# Patient Record
Sex: FEMALE | Race: WHITE | NOT HISPANIC OR LATINO | URBAN - METROPOLITAN AREA
[De-identification: names, ages, dates, MRNs, and addresses within clinical notes are randomized per-mention and may not be internally consistent; named-entity substitution may affect disease eponyms.]

---

## 2017-11-30 ENCOUNTER — APPOINTMENT (RX ONLY)
Dept: URBAN - METROPOLITAN AREA CLINIC 91 | Facility: CLINIC | Age: 77
Setting detail: DERMATOLOGY
End: 2017-11-30

## 2017-11-30 DIAGNOSIS — Z85.820 PERSONAL HISTORY OF MALIGNANT MELANOMA OF SKIN: ICD-10-CM

## 2017-11-30 DIAGNOSIS — L85.3 XEROSIS CUTIS: ICD-10-CM

## 2017-11-30 DIAGNOSIS — D18.0 HEMANGIOMA: ICD-10-CM

## 2017-11-30 DIAGNOSIS — L57.0 ACTINIC KERATOSIS: ICD-10-CM

## 2017-11-30 PROBLEM — L55.1 SUNBURN OF SECOND DEGREE: Status: ACTIVE | Noted: 2017-11-30

## 2017-11-30 PROBLEM — D48.5 NEOPLASM OF UNCERTAIN BEHAVIOR OF SKIN: Status: ACTIVE | Noted: 2017-11-30

## 2017-11-30 PROBLEM — D18.01 HEMANGIOMA OF SKIN AND SUBCUTANEOUS TISSUE: Status: ACTIVE | Noted: 2017-11-30

## 2017-11-30 PROCEDURE — 11100: CPT | Mod: 59

## 2017-11-30 PROCEDURE — ? LIQUID NITROGEN

## 2017-11-30 PROCEDURE — 11101: CPT

## 2017-11-30 PROCEDURE — 99214 OFFICE O/P EST MOD 30 MIN: CPT | Mod: 25

## 2017-11-30 PROCEDURE — ? TREATMENT REGIMEN

## 2017-11-30 PROCEDURE — 17000 DESTRUCT PREMALG LESION: CPT

## 2017-11-30 PROCEDURE — ? COUNSELING

## 2017-11-30 PROCEDURE — ? BIOPSY BY SHAVE METHOD

## 2017-11-30 ASSESSMENT — LOCATION DETAILED DESCRIPTION DERM
LOCATION DETAILED: LEFT MEDIAL BREAST 10-11:00 REGION
LOCATION DETAILED: NASAL DORSUM
LOCATION DETAILED: LEFT DISTAL PRETIBIAL REGION
LOCATION DETAILED: LEFT POSTERIOR ANKLE

## 2017-11-30 ASSESSMENT — LOCATION SIMPLE DESCRIPTION DERM
LOCATION SIMPLE: LEFT ANKLE
LOCATION SIMPLE: NOSE
LOCATION SIMPLE: LEFT BREAST
LOCATION SIMPLE: LEFT PRETIBIAL REGION

## 2017-11-30 ASSESSMENT — LOCATION ZONE DERM
LOCATION ZONE: TRUNK
LOCATION ZONE: NOSE
LOCATION ZONE: LEG

## 2017-11-30 NOTE — PROCEDURE: MIPS QUALITY
Quality 137: Melanoma: Continuity Of Care - Recall System: Patient information entered into a recall system that includes: target date for the next exam specified AND a process to follow up with patients regarding missed or unscheduled appointments
Detail Level: Detailed
Quality 111:Pneumonia Vaccination Status For Older Adults: Pneumococcal Vaccination Previously Received
Quality 138: Melanoma: Coordination Of Care: A treatment plan was communicated to the physicians providing continuing care within one month of diagnosis outlining: diagnosis, tumor thickness and a plan for surgery or alternate care.
Quality 110: Preventive Care And Screening: Influenza Immunization: Influenza Immunization previously received during influenza season
Quality 130: Documentation Of Current Medications In The Medical Record: Current Medications Documented

## 2017-11-30 NOTE — PROCEDURE: LIQUID NITROGEN
Duration Of Freeze Thaw-Cycle (Seconds): 6
Detail Level: Detailed
Post-Care Instructions: Reviewed in detail post-care instructions. Patient is to wear sun protection, and avoid picking at any of the treated lesions. Pt may apply Vaseline to crusted or scabbing areas.
Number Of Freeze-Thaw Cycles: 2 freeze-thaw cycles
Consent: The patient's consent was obtained including but not limited to risks of crusting, scabbing, blistering, scarring, darker or lighter pigmentary change, recurrence, incomplete removal and infection.
Render Post-Care Instructions In Note?: yes

## 2017-11-30 NOTE — PROCEDURE: BIOPSY BY SHAVE METHOD
Post-care instructions reviewed with the patient in detail. The patient is to keep the biopsy site dry overnight. Remove the bandage and shower as normal with soap and water, dry the area, apply vaseline and a band-aid. Repeat this process daily for five days.
Silver Nitrate Text: The wound bed was treated with silver nitrate after the biopsy was performed.
Additional Anesthesia Volume In Cc (Will Not Render If 0): 0
Biopsy Method: 15 blade
Type Of Destruction Used: Curettage
Billing Type: [employer]
Bill For Surgical Tray: no
Cryotherapy Text: The wound bed was treated with cryotherapy after the biopsy was performed.
Consent was obtained and risks were reviewed including but not limited to scarring, infection, bleeding, scabbing, incomplete removal, nerve damage and allergy to anesthesia.
Wound Care: Vaseline
Dressing: bandage
Hemostasis: Aluminum Chloride
Notification Instructions: Patient will be notified of biopsy results. However, patient instructed to call the office if not contacted within 2 weeks.
Render Post-Care Instructions In Note?: yes
Anesthesia Type: 1% lidocaine with epinephrine
Biopsy Type: H and E
Anesthesia Volume In Cc (Will Not Render If 0): 0.5
Curettage Text: The wound bed was treated with curettage after the biopsy was performed.
Detail Level: Detailed
Electrodesiccation And Curettage Text: The wound bed was treated with electrodesiccation and curettage after the biopsy was performed.
Size Of Lesion In Cm: 1
Billing Type: Third-Party Bill
Electrodesiccation Text: The wound bed was treated with electrodesiccation after the biopsy was performed.
Size Of Lesion In Cm: 1.6

## 2018-02-08 ENCOUNTER — APPOINTMENT (RX ONLY)
Dept: URBAN - METROPOLITAN AREA CLINIC 91 | Facility: CLINIC | Age: 78
Setting detail: DERMATOLOGY
End: 2018-02-08

## 2018-02-08 PROBLEM — C44.519 BASAL CELL CARCINOMA OF SKIN OF OTHER PART OF TRUNK: Status: ACTIVE | Noted: 2018-02-08

## 2018-02-08 PROCEDURE — ? CURETTAGE AND DESTRUCTION

## 2018-02-08 PROCEDURE — 17263 DSTRJ MAL LES T/A/L 2.1-3.0: CPT

## 2018-02-08 NOTE — PROCEDURE: CURETTAGE AND DESTRUCTION
Size Of Lesion After Curettage: 2.6
Cautery Type: electrocautery (heat cautery)
Medication Injected: 5-Fluorouracil
Add Intralesional Injection: No
Bill As A Line Item Or As Units: Line Item
Detail Level: Detailed
Number Of Curettages: 3
Consent was obtained from the patient. The risks, benefits and alternatives to therapy were discussed in detail. Specifically, the risks of infection, scarring, bleeding, prolonged wound healing, nerve injury, incomplete removal, allergy to anesthesia and recurrence were addressed. Prior to the procedure, the treatment site was clearly identified and confirmed by the patient. All components of Universal Protocol/PAUSE Rule completed.
Size Of Lesion In Cm: 1.6
Render Post-Care Instructions In Note?: yes
Anesthesia Volume In Cc: 2
Total Volume (Ccs): 1
What Was Performed First?: Curettage
Additional Information: (Optional): The wound was cleaned, and a pressure dressing was applied. The patient received detailed post-op instructions.
Post-care instructions reviewed with the patient in detail. The patient is to keep the treated site dry overnight. Remove the bandage and shower as normal with soap and water, dry the area, apply vaseline and a band-aid. Repeat this process daily for 10 days. Should the patient develop any fevers, chills, bleeding, severe pain patient will contact the office immediately.
Anesthesia Type: 1% lidocaine with epinephrine

## 2018-05-31 ENCOUNTER — APPOINTMENT (RX ONLY)
Dept: URBAN - METROPOLITAN AREA CLINIC 91 | Facility: CLINIC | Age: 78
Setting detail: DERMATOLOGY
End: 2018-05-31

## 2018-05-31 DIAGNOSIS — L57.0 ACTINIC KERATOSIS: ICD-10-CM

## 2018-05-31 DIAGNOSIS — Z85.820 PERSONAL HISTORY OF MALIGNANT MELANOMA OF SKIN: ICD-10-CM

## 2018-05-31 DIAGNOSIS — D69.2 OTHER NONTHROMBOCYTOPENIC PURPURA: ICD-10-CM

## 2018-05-31 PROBLEM — D48.5 NEOPLASM OF UNCERTAIN BEHAVIOR OF SKIN: Status: ACTIVE | Noted: 2018-05-31

## 2018-05-31 PROBLEM — Z85.828 PERSONAL HISTORY OF OTHER MALIGNANT NEOPLASM OF SKIN: Status: ACTIVE | Noted: 2018-05-31

## 2018-05-31 PROCEDURE — 99213 OFFICE O/P EST LOW 20 MIN: CPT | Mod: 25

## 2018-05-31 PROCEDURE — ? LIQUID NITROGEN

## 2018-05-31 PROCEDURE — ? COUNSELING

## 2018-05-31 PROCEDURE — ? BIOPSY BY SHAVE METHOD

## 2018-05-31 PROCEDURE — 17000 DESTRUCT PREMALG LESION: CPT

## 2018-05-31 PROCEDURE — 17003 DESTRUCT PREMALG LES 2-14: CPT

## 2018-05-31 PROCEDURE — 11100: CPT | Mod: 59

## 2018-05-31 ASSESSMENT — LOCATION ZONE DERM
LOCATION ZONE: TRUNK
LOCATION ZONE: NOSE
LOCATION ZONE: LEG
LOCATION ZONE: ARM
LOCATION ZONE: HAND
LOCATION ZONE: FACE

## 2018-05-31 ASSESSMENT — LOCATION SIMPLE DESCRIPTION DERM
LOCATION SIMPLE: NOSE
LOCATION SIMPLE: LEFT HAND
LOCATION SIMPLE: RIGHT UPPER BACK
LOCATION SIMPLE: LEFT ANKLE
LOCATION SIMPLE: LEFT FOREHEAD
LOCATION SIMPLE: RIGHT FOREARM
LOCATION SIMPLE: RIGHT CHEEK
LOCATION SIMPLE: LEFT FOREARM
LOCATION SIMPLE: LEFT CHEEK

## 2018-05-31 ASSESSMENT — LOCATION DETAILED DESCRIPTION DERM
LOCATION DETAILED: RIGHT SUPERIOR UPPER BACK
LOCATION DETAILED: LEFT POSTERIOR ANKLE
LOCATION DETAILED: LEFT MEDIAL FOREHEAD
LOCATION DETAILED: LEFT CENTRAL MALAR CHEEK
LOCATION DETAILED: RIGHT INFERIOR CENTRAL MALAR CHEEK
LOCATION DETAILED: LEFT ULNAR DORSAL HAND
LOCATION DETAILED: LEFT DISTAL DORSAL FOREARM
LOCATION DETAILED: NASAL DORSUM
LOCATION DETAILED: RIGHT DISTAL DORSAL FOREARM

## 2018-05-31 NOTE — PROCEDURE: BIOPSY BY SHAVE METHOD
Electrodesiccation And Curettage Text: The wound bed was treated with electrodesiccation and curettage after the biopsy was performed.
Render Post-Care Instructions In Note?: yes
Dressing: bandage
Biopsy Type: H and E
Notification Instructions: Patient will be notified of biopsy results. However, patient instructed to call the office if not contacted within 2 weeks.
Anesthesia Type: 1% lidocaine with epinephrine
Electrodesiccation Text: The wound bed was treated with electrodesiccation after the biopsy was performed.
Anesthesia Volume In Cc (Will Not Render If 0): 0.5
Bill 72754 For Specimen Handling/Conveyance To Laboratory?: no
Billing Type: [employer]
Consent was obtained and risks were reviewed including but not limited to scarring, infection, bleeding, scabbing, incomplete removal, nerve damage and allergy to anesthesia.
X Size Of Lesion In Cm: 0
Biopsy Method: 15 blade
Detail Level: Detailed
Type Of Destruction Used: Curettage
Wound Care: Vaseline
Post-care instructions reviewed with the patient in detail. The patient is to keep the biopsy site dry overnight. Remove the bandage and shower as normal with soap and water, dry the area, apply vaseline and a band-aid. Repeat this process daily for five days.
Hemostasis: Aluminum Chloride
Silver Nitrate Text: The wound bed was treated with silver nitrate after the biopsy was performed.
Cryotherapy Text: The wound bed was treated with cryotherapy after the biopsy was performed.
Curettage Text: The wound bed was treated with curettage after the biopsy was performed.
Size Of Lesion In Cm: 1.1

## 2018-05-31 NOTE — PROCEDURE: LIQUID NITROGEN
Number Of Freeze-Thaw Cycles: 2 freeze-thaw cycles
Render Post-Care Instructions In Note?: yes
Post-Care Instructions: Reviewed in detail post-care instructions. Patient is to wear sun protection, and avoid picking at any of the treated lesions. Pt may apply Vaseline to crusted or scabbing areas.
Duration Of Freeze Thaw-Cycle (Seconds): 6
Detail Level: Detailed
Consent: The patient's consent was obtained including but not limited to risks of crusting, scabbing, blistering, scarring, darker or lighter pigmentary change, recurrence, incomplete removal and infection.

## 2018-12-04 ENCOUNTER — APPOINTMENT (RX ONLY)
Dept: URBAN - METROPOLITAN AREA CLINIC 91 | Facility: CLINIC | Age: 78
Setting detail: DERMATOLOGY
End: 2018-12-04

## 2018-12-04 DIAGNOSIS — L85.3 XEROSIS CUTIS: ICD-10-CM

## 2018-12-04 DIAGNOSIS — L57.0 ACTINIC KERATOSIS: ICD-10-CM

## 2018-12-04 DIAGNOSIS — Z85.820 PERSONAL HISTORY OF MALIGNANT MELANOMA OF SKIN: ICD-10-CM

## 2018-12-04 PROBLEM — Z85.828 PERSONAL HISTORY OF OTHER MALIGNANT NEOPLASM OF SKIN: Status: ACTIVE | Noted: 2018-12-04

## 2018-12-04 PROBLEM — D48.5 NEOPLASM OF UNCERTAIN BEHAVIOR OF SKIN: Status: ACTIVE | Noted: 2018-12-04

## 2018-12-04 PROBLEM — L55.1 SUNBURN OF SECOND DEGREE: Status: ACTIVE | Noted: 2018-12-04

## 2018-12-04 PROCEDURE — ? BIOPSY BY SHAVE METHOD

## 2018-12-04 PROCEDURE — 11100: CPT | Mod: 59

## 2018-12-04 PROCEDURE — 17000 DESTRUCT PREMALG LESION: CPT

## 2018-12-04 PROCEDURE — ? COUNSELING

## 2018-12-04 PROCEDURE — ? LIQUID NITROGEN

## 2018-12-04 PROCEDURE — 99214 OFFICE O/P EST MOD 30 MIN: CPT | Mod: 25

## 2018-12-04 PROCEDURE — 17003 DESTRUCT PREMALG LES 2-14: CPT

## 2018-12-04 ASSESSMENT — LOCATION ZONE DERM
LOCATION ZONE: NOSE
LOCATION ZONE: LEG
LOCATION ZONE: ARM

## 2018-12-04 ASSESSMENT — LOCATION SIMPLE DESCRIPTION DERM
LOCATION SIMPLE: RIGHT FOREARM
LOCATION SIMPLE: LEFT FOREARM
LOCATION SIMPLE: LEFT ANKLE
LOCATION SIMPLE: NOSE

## 2018-12-04 ASSESSMENT — LOCATION DETAILED DESCRIPTION DERM
LOCATION DETAILED: LEFT POSTERIOR ANKLE
LOCATION DETAILED: RIGHT NASAL DORSUM
LOCATION DETAILED: LEFT DISTAL DORSAL FOREARM
LOCATION DETAILED: RIGHT DISTAL DORSAL FOREARM

## 2018-12-04 NOTE — PROCEDURE: BIOPSY BY SHAVE METHOD
Detail Level: Detailed
Anesthesia Type: 1% lidocaine with epinephrine
Additional Anesthesia Volume In Cc (Will Not Render If 0): 0
Was A Bandage Applied: Yes
Hemostasis: Aluminum Chloride
Destruction After The Procedure: No
Electrodesiccation Text: The wound bed was treated with electrodesiccation after the biopsy was performed.
Type Of Destruction Used: Curettage
Silver Nitrate Text: The wound bed was treated with silver nitrate after the biopsy was performed.
Consent was obtained and risks were reviewed including but not limited to scarring, infection, bleeding, scabbing, incomplete removal, nerve damage and allergy to anesthesia.
Notification Instructions: Patient will be notified of biopsy results. However, patient instructed to call the office if not contacted within 2 weeks.
Dressing: bandage
Wound Care: Vaseline
Anesthesia Volume In Cc (Will Not Render If 0): 0.5
Biopsy Type: H and E
Billing Type: [employer]
Cryotherapy Text: The wound bed was treated with cryotherapy after the biopsy was performed.
Curettage Text: The wound bed was treated with curettage after the biopsy was performed.
Post-care instructions reviewed with the patient in detail. The patient is to keep the biopsy site dry overnight. Remove the bandage and shower as normal with soap and water, dry the area, apply vaseline and a band-aid. Repeat this process daily for five days.
Size Of Lesion In Cm: 0.6
Biopsy Method: 15 blade
Depth Of Biopsy: dermis
Electrodesiccation And Curettage Text: The wound bed was treated with electrodesiccation and curettage after the biopsy was performed.

## 2018-12-04 NOTE — PROCEDURE: LIQUID NITROGEN
Post-Care Instructions: Reviewed in detail post-care instructions. Patient is to wear sun protection, and avoid picking at any of the treated lesions. Pt may apply Vaseline to crusted or scabbing areas.
Number Of Freeze-Thaw Cycles: 2 freeze-thaw cycles
Consent: The patient's consent was obtained including but not limited to risks of crusting, scabbing, blistering, scarring, darker or lighter pigmentary change, recurrence, incomplete removal and infection.
Render Post-Care Instructions In Note?: yes
Detail Level: Detailed
Duration Of Freeze Thaw-Cycle (Seconds): 6

## 2018-12-04 NOTE — PROCEDURE: MIPS QUALITY
Quality 137: Melanoma: Continuity Of Care - Recall System: Patient information entered into a recall system that includes: target date for the next exam specified AND a process to follow up with patients regarding missed or unscheduled appointments
Detail Level: Detailed
Quality 110: Preventive Care And Screening: Influenza Immunization: Influenza Immunization previously received during influenza season
Quality 138: Melanoma: Coordination Of Care: A treatment plan was communicated to the physicians providing continuing care within one month of diagnosis outlining: diagnosis, tumor thickness and a plan for surgery or alternate care.
Quality 130: Documentation Of Current Medications In The Medical Record: Current Medications Documented
Quality 111:Pneumonia Vaccination Status For Older Adults: Pneumococcal Vaccination Previously Received

## 2019-02-21 ENCOUNTER — APPOINTMENT (RX ONLY)
Dept: URBAN - METROPOLITAN AREA CLINIC 91 | Facility: CLINIC | Age: 79
Setting detail: DERMATOLOGY
End: 2019-02-21

## 2019-02-21 PROBLEM — Z85.820 PERSONAL HISTORY OF MALIGNANT MELANOMA OF SKIN: Status: ACTIVE | Noted: 2019-02-21

## 2019-02-21 PROBLEM — D48.5 NEOPLASM OF UNCERTAIN BEHAVIOR OF SKIN: Status: ACTIVE | Noted: 2019-02-21

## 2019-02-21 PROBLEM — C44.41 BASAL CELL CARCINOMA OF SKIN OF SCALP AND NECK: Status: ACTIVE | Noted: 2019-02-21

## 2019-02-21 PROCEDURE — 17272 DSTR MAL LES S/N/H/F/G 1.1-2: CPT

## 2019-02-21 PROCEDURE — ? PATHOLOGY DISCUSSION

## 2019-02-21 PROCEDURE — ? CURETTAGE AND DESTRUCTION

## 2019-02-21 PROCEDURE — ? COUNSELING

## 2019-02-21 NOTE — PROCEDURE: CURETTAGE AND DESTRUCTION
Hide Accession Number?: No
Bill As A Line Item Or As Units: Line Item
Total Volume (Ccs): 1
Consent was obtained from the patient. The risks, benefits and alternatives to therapy were discussed in detail. Specifically, the risks of infection, scarring, bleeding, prolonged wound healing, nerve injury, incomplete removal, allergy to anesthesia and recurrence were addressed. Prior to the procedure, the treatment site was clearly identified and confirmed by the patient. All components of Universal Protocol/PAUSE Rule completed.
Size Of Lesion In Cm: 0.6
Render Post-Care Instructions In Note?: yes
Post-care instructions reviewed with the patient in detail. The patient is to keep the treated site dry overnight. Remove the bandage and shower as normal with soap and water, dry the area, apply vaseline and a band-aid. Repeat this process daily for 10 days. Should the patient develop any fevers, chills, bleeding, severe pain patient will contact the office immediately.
Cautery Type: electrocautery (heat cautery)
Number Of Curettages: 3
Detail Level: Detailed
Size Of Lesion After Curettage: 1.6
Anesthesia Volume In Cc: 2
Additional Information: (Optional): The wound was cleaned, and a pressure dressing was applied. The patient received detailed post-op instructions.
Anesthesia Type: 1% lidocaine with epinephrine
What Was Performed First?: Curettage

## 2019-06-06 ENCOUNTER — APPOINTMENT (RX ONLY)
Dept: URBAN - METROPOLITAN AREA CLINIC 91 | Facility: CLINIC | Age: 79
Setting detail: DERMATOLOGY
End: 2019-06-06

## 2019-06-06 DIAGNOSIS — L57.0 ACTINIC KERATOSIS: ICD-10-CM

## 2019-06-06 DIAGNOSIS — Z85.820 PERSONAL HISTORY OF MALIGNANT MELANOMA OF SKIN: ICD-10-CM

## 2019-06-06 DIAGNOSIS — L85.3 XEROSIS CUTIS: ICD-10-CM

## 2019-06-06 DIAGNOSIS — D18.0 HEMANGIOMA: ICD-10-CM

## 2019-06-06 DIAGNOSIS — L82.1 OTHER SEBORRHEIC KERATOSIS: ICD-10-CM

## 2019-06-06 PROBLEM — Z85.828 PERSONAL HISTORY OF OTHER MALIGNANT NEOPLASM OF SKIN: Status: ACTIVE | Noted: 2019-06-06

## 2019-06-06 PROBLEM — D18.01 HEMANGIOMA OF SKIN AND SUBCUTANEOUS TISSUE: Status: ACTIVE | Noted: 2019-06-06

## 2019-06-06 PROBLEM — D48.5 NEOPLASM OF UNCERTAIN BEHAVIOR OF SKIN: Status: ACTIVE | Noted: 2019-06-06

## 2019-06-06 PROCEDURE — ? NOTED ON EXAM BUT NOT TREATED

## 2019-06-06 PROCEDURE — 17000 DESTRUCT PREMALG LESION: CPT

## 2019-06-06 PROCEDURE — 17003 DESTRUCT PREMALG LES 2-14: CPT

## 2019-06-06 PROCEDURE — ? LIQUID NITROGEN

## 2019-06-06 PROCEDURE — ? COUNSELING

## 2019-06-06 PROCEDURE — 99214 OFFICE O/P EST MOD 30 MIN: CPT | Mod: 25

## 2019-06-06 PROCEDURE — ? RECOMMENDATIONS

## 2019-06-06 ASSESSMENT — LOCATION DETAILED DESCRIPTION DERM
LOCATION DETAILED: EPIGASTRIC SKIN
LOCATION DETAILED: RIGHT NASAL DORSUM
LOCATION DETAILED: INFERIOR THORACIC SPINE
LOCATION DETAILED: LOWER STERNUM
LOCATION DETAILED: LEFT POSTERIOR ANKLE
LOCATION DETAILED: NASAL DORSUM

## 2019-06-06 ASSESSMENT — LOCATION SIMPLE DESCRIPTION DERM
LOCATION SIMPLE: CHEST
LOCATION SIMPLE: UPPER BACK
LOCATION SIMPLE: ABDOMEN
LOCATION SIMPLE: NOSE
LOCATION SIMPLE: LEFT ANKLE

## 2019-06-06 ASSESSMENT — LOCATION ZONE DERM
LOCATION ZONE: TRUNK
LOCATION ZONE: LEG
LOCATION ZONE: NOSE

## 2019-06-06 NOTE — PROCEDURE: MIPS QUALITY
Detail Level: Detailed
Quality 137: Melanoma: Continuity Of Care - Recall System: Patient information entered into a recall system that includes: target date for the next exam specified AND a process to follow up with patients regarding missed or unscheduled appointments
Quality 138: Melanoma: Coordination Of Care: A treatment plan was communicated to the physicians providing continuing care within one month of diagnosis outlining: diagnosis, tumor thickness and a plan for surgery or alternate care.
Quality 130: Documentation Of Current Medications In The Medical Record: Current Medications Documented
Quality 110: Preventive Care And Screening: Influenza Immunization: Influenza Immunization previously received during influenza season
Quality 131: Pain Assessment And Follow-Up: Pain assessment using a standardized tool is documented as negative, no follow-up plan required
Quality 111:Pneumonia Vaccination Status For Older Adults: Pneumococcal Vaccination Previously Received

## 2019-06-06 NOTE — PROCEDURE: LIQUID NITROGEN
Post-Care Instructions: Reviewed in detail post-care instructions. Patient is to wear sun protection, and avoid picking at any of the treated lesions. Pt may apply Vaseline to crusted or scabbing areas.
Detail Level: Detailed
Duration Of Freeze Thaw-Cycle (Seconds): 6
Render Note In Bullet Format When Appropriate: No
Number Of Freeze-Thaw Cycles: 2 freeze-thaw cycles
Consent: The patient's consent was obtained including but not limited to risks of crusting, scabbing, blistering, scarring, darker or lighter pigmentary change, recurrence, incomplete removal and infection.
Render Post-Care Instructions In Note?: yes

## 2019-06-06 NOTE — PROCEDURE: RECOMMENDATIONS
Recommendation Preamble: The following recommendations were made during the visit:
Detail Level: Zone
Recommendations (Free Text): Cetaphil cream after the shower everyday

## 2019-12-05 ENCOUNTER — APPOINTMENT (RX ONLY)
Dept: URBAN - METROPOLITAN AREA CLINIC 91 | Facility: CLINIC | Age: 79
Setting detail: DERMATOLOGY
End: 2019-12-05

## 2019-12-05 DIAGNOSIS — L85.3 XEROSIS CUTIS: ICD-10-CM

## 2019-12-05 DIAGNOSIS — Z85.820 PERSONAL HISTORY OF MALIGNANT MELANOMA OF SKIN: ICD-10-CM

## 2019-12-05 PROBLEM — Z85.828 PERSONAL HISTORY OF OTHER MALIGNANT NEOPLASM OF SKIN: Status: ACTIVE | Noted: 2019-12-05

## 2019-12-05 PROCEDURE — ? COUNSELING

## 2019-12-05 PROCEDURE — ? NOTED ON EXAM BUT NOT TREATED

## 2019-12-05 PROCEDURE — 99214 OFFICE O/P EST MOD 30 MIN: CPT

## 2019-12-05 ASSESSMENT — LOCATION DETAILED DESCRIPTION DERM
LOCATION DETAILED: LEFT POSTERIOR ANKLE
LOCATION DETAILED: RIGHT DISTAL PRETIBIAL REGION
LOCATION DETAILED: LEFT PROXIMAL PRETIBIAL REGION

## 2019-12-05 ASSESSMENT — LOCATION SIMPLE DESCRIPTION DERM
LOCATION SIMPLE: LEFT PRETIBIAL REGION
LOCATION SIMPLE: RIGHT PRETIBIAL REGION
LOCATION SIMPLE: LEFT ANKLE

## 2019-12-05 ASSESSMENT — LOCATION ZONE DERM: LOCATION ZONE: LEG

## 2019-12-05 NOTE — PROCEDURE: MIPS QUALITY
Quality 111:Pneumonia Vaccination Status For Older Adults: Pneumococcal Vaccination Previously Received
Quality 137: Melanoma: Continuity Of Care - Recall System: Patient information entered into a recall system that includes: target date for the next exam specified AND a process to follow up with patients regarding missed or unscheduled appointments
Quality 131: Pain Assessment And Follow-Up: Pain assessment using a standardized tool is documented as negative, no follow-up plan required
Detail Level: Detailed
Quality 138: Melanoma: Coordination Of Care: A treatment plan was communicated to the physicians providing continuing care within one month of diagnosis outlining: diagnosis, tumor thickness and a plan for surgery or alternate care.
Quality 130: Documentation Of Current Medications In The Medical Record: Current Medications Documented
Quality 110: Preventive Care And Screening: Influenza Immunization: Influenza Immunization previously received during influenza season

## 2022-02-17 ENCOUNTER — APPOINTMENT (RX ONLY)
Dept: URBAN - METROPOLITAN AREA CLINIC 91 | Facility: CLINIC | Age: 82
Setting detail: DERMATOLOGY
End: 2022-02-17

## 2022-02-17 DIAGNOSIS — L0390 CELLULITIS AND ABSCESS OF UNSPECIFIED SITES: ICD-10-CM

## 2022-02-17 DIAGNOSIS — L0391 CELLULITIS AND ABSCESS OF UNSPECIFIED SITES: ICD-10-CM

## 2022-02-17 PROBLEM — L02.212 CUTANEOUS ABSCESS OF BACK [ANY PART, EXCEPT BUTTOCK]: Status: ACTIVE | Noted: 2022-02-17

## 2022-02-17 PROBLEM — L57.0 ACTINIC KERATOSIS: Status: ACTIVE | Noted: 2022-02-17

## 2022-02-17 PROBLEM — Z85.828 PERSONAL HISTORY OF OTHER MALIGNANT NEOPLASM OF SKIN: Status: ACTIVE | Noted: 2022-02-17

## 2022-02-17 PROBLEM — Z85.820 PERSONAL HISTORY OF MALIGNANT MELANOMA OF SKIN: Status: ACTIVE | Noted: 2022-02-17

## 2022-02-17 PROCEDURE — ? DISCUSSION OF MANAGEMENT WITH EXTERNAL PROVIDER

## 2022-02-17 PROCEDURE — 99213 OFFICE O/P EST LOW 20 MIN: CPT

## 2022-02-17 PROCEDURE — ? COUNSELING

## 2022-02-17 PROCEDURE — ? DEFER

## 2022-02-17 ASSESSMENT — LOCATION ZONE DERM: LOCATION ZONE: TRUNK

## 2022-02-17 ASSESSMENT — LOCATION SIMPLE DESCRIPTION DERM: LOCATION SIMPLE: LEFT UPPER BACK

## 2022-02-17 ASSESSMENT — LOCATION DETAILED DESCRIPTION DERM: LOCATION DETAILED: LEFT MEDIAL UPPER BACK

## 2022-02-17 NOTE — PROCEDURE: DEFER
Introduction Text (Please End With A Colon): The following procedure was deferred:
Detail Level: Detailed
Instructions (Optional): Referred the patient to go to the emergency room due to the significant size, warmth and redness

## 2022-02-17 NOTE — HPI: SKIN LESION
What Type Of Note Output Would You Prefer (Optional)?: Bullet Format
How Severe Is Your Skin Lesion?: severe
Is This A New Presentation, Or A Follow-Up?: Skin Lesion
Additional History: Patientâs daughter states she has been taking an antibiotic(possibly Keflex) 4 times daily since Saturday prescribed by PCP Dr. Jaxon Belcher.

## 2024-06-04 NOTE — HPI: FULL BODY SKIN EXAMINATION
How Severe Are Your Spot(S)?: mild
What Is The Reason For Today's Visit?: Full Body Skin Examination
What Is The Reason For Today's Visit? (Being Monitored For X): the development of new lesions